# Patient Record
Sex: MALE | Race: WHITE | Employment: OTHER | URBAN - METROPOLITAN AREA
[De-identification: names, ages, dates, MRNs, and addresses within clinical notes are randomized per-mention and may not be internally consistent; named-entity substitution may affect disease eponyms.]

---

## 2018-08-13 PROBLEM — R53.83 OTHER FATIGUE: Status: ACTIVE | Noted: 2018-04-27

## 2018-08-13 PROBLEM — F90.9 ADHD: Status: ACTIVE | Noted: 2018-04-27

## 2018-11-16 ENCOUNTER — HOSPITAL ENCOUNTER (EMERGENCY)
Facility: HOSPITAL | Age: 33
Discharge: HOME | End: 2018-11-17
Attending: EMERGENCY MEDICINE
Payer: COMMERCIAL

## 2018-11-16 VITALS
WEIGHT: 160 LBS | RESPIRATION RATE: 18 BRPM | HEIGHT: 64 IN | TEMPERATURE: 98 F | BODY MASS INDEX: 27.31 KG/M2 | HEART RATE: 84 BPM | SYSTOLIC BLOOD PRESSURE: 142 MMHG | DIASTOLIC BLOOD PRESSURE: 77 MMHG | OXYGEN SATURATION: 98 %

## 2018-11-16 DIAGNOSIS — M79.89 FOOT SWELLING: Primary | ICD-10-CM

## 2018-11-16 PROCEDURE — 99283 EMERGENCY DEPT VISIT LOW MDM: CPT

## 2018-11-16 RX ORDER — IBUPROFEN 600 MG/1
600 TABLET ORAL ONCE
Status: COMPLETED | OUTPATIENT
Start: 2018-11-17 | End: 2018-11-17

## 2018-11-16 RX ORDER — KETOROLAC TROMETHAMINE 10 MG/1
10 TABLET, FILM COATED ORAL EVERY 6 HOURS PRN
Qty: 30 TABLET | Refills: 0 | Status: SHIPPED | OUTPATIENT
Start: 2018-11-16 | End: 2018-11-23

## 2018-11-16 RX ORDER — ONDANSETRON 4 MG/1
4 TABLET, ORALLY DISINTEGRATING ORAL ONCE
Status: COMPLETED | OUTPATIENT
Start: 2018-11-16 | End: 2018-11-16

## 2018-11-17 NOTE — ED PROVIDER NOTES
Patient Seen in: [facility] AND [facility] Emergency Department    History   Patient presents with:  Nausea    Stated Complaint: pain and numbness in right leg \"toes are turning purple\"    HPI    27-year-old male with no significant past medical history here w stated complaint: pain and numbness in right leg \"toes are turning purple\"  Other systems are as noted in HPI. Constitutional and vital signs reviewed. All other systems reviewed and negative except as noted above.     Physical Exam     ED Triage Vi oxygenation. PROCEDURES:  none    DIAGNOSTICS:   Labs:  No results found for this or any previous visit (from the past 24 hour(s)).     Imaging Results Available and Reviewed by me while in ED:          EMERGENCY DEPARTMENT COURSE AND TREATMENT:  Patient diagnosis)    Disposition:  Discharge  11/16/2018 11:58 pm    Follow-up:  Page Suárez MD  [address] [address]  [phone]    Schedule an appointment as soon as possible for a visit in 2 days  As needed        Medications Prescribed:

## 2024-08-21 PROCEDURE — 85379 FIBRIN DEGRADATION QUANT: CPT | Performed by: EMERGENCY MEDICINE

## 2024-08-21 PROCEDURE — 96374 THER/PROPH/DIAG INJ IV PUSH: CPT

## 2024-08-21 PROCEDURE — 99285 EMERGENCY DEPT VISIT HI MDM: CPT

## 2024-08-21 PROCEDURE — 80053 COMPREHEN METABOLIC PANEL: CPT

## 2024-08-21 PROCEDURE — 84484 ASSAY OF TROPONIN QUANT: CPT | Performed by: EMERGENCY MEDICINE

## 2024-08-21 PROCEDURE — 84484 ASSAY OF TROPONIN QUANT: CPT

## 2024-08-21 PROCEDURE — 96372 THER/PROPH/DIAG INJ SC/IM: CPT

## 2024-08-21 PROCEDURE — 85025 COMPLETE CBC W/AUTO DIFF WBC: CPT | Performed by: EMERGENCY MEDICINE

## 2024-08-21 PROCEDURE — 93005 ELECTROCARDIOGRAM TRACING: CPT

## 2024-08-21 PROCEDURE — 85025 COMPLETE CBC W/AUTO DIFF WBC: CPT

## 2024-08-21 PROCEDURE — 93010 ELECTROCARDIOGRAM REPORT: CPT

## 2024-08-21 RX ORDER — DEXTROAMPHETAMINE SACCHARATE, AMPHETAMINE ASPARTATE MONOHYDRATE, DEXTROAMPHETAMINE SULFATE AND AMPHETAMINE SULFATE 2.5; 2.5; 2.5; 2.5 MG/1; MG/1; MG/1; MG/1
10 CAPSULE, EXTENDED RELEASE ORAL EVERY MORNING
COMMUNITY

## 2024-08-22 ENCOUNTER — HOSPITAL ENCOUNTER (EMERGENCY)
Facility: HOSPITAL | Age: 39
Discharge: HOME | End: 2024-08-22
Attending: EMERGENCY MEDICINE
Payer: COMMERCIAL

## 2024-08-22 ENCOUNTER — APPOINTMENT (OUTPATIENT)
Dept: CT IMAGING | Facility: HOSPITAL | Age: 39
End: 2024-08-22
Attending: EMERGENCY MEDICINE
Payer: COMMERCIAL

## 2024-08-22 ENCOUNTER — APPOINTMENT (OUTPATIENT)
Dept: GENERAL RADIOLOGY | Facility: HOSPITAL | Age: 39
End: 2024-08-22
Payer: COMMERCIAL

## 2024-08-22 VITALS
WEIGHT: 160 LBS | RESPIRATION RATE: 14 BRPM | DIASTOLIC BLOOD PRESSURE: 87 MMHG | HEART RATE: 56 BPM | HEIGHT: 64 IN | OXYGEN SATURATION: 100 % | TEMPERATURE: 97 F | SYSTOLIC BLOOD PRESSURE: 118 MMHG | BODY MASS INDEX: 27.31 KG/M2

## 2024-08-22 DIAGNOSIS — R07.9 CHEST PAIN OF UNCERTAIN ETIOLOGY: Primary | ICD-10-CM

## 2024-08-22 LAB
ALBUMIN SERPL-MCNC: 4.9 G/DL (ref 3.2–4.8)
ALBUMIN/GLOB SERPL: 1.9 {RATIO} (ref 1–2)
ALP LIVER SERPL-CCNC: 62 U/L
ALT SERPL-CCNC: 34 U/L
ANION GAP SERPL CALC-SCNC: 7 MMOL/L (ref 0–18)
ATRIAL RATE: 56 BPM
BASOPHILS # BLD AUTO: 0.05 X10(3) UL (ref 0–0.2)
BASOPHILS NFR BLD AUTO: 0.7 %
BILIRUB SERPL-MCNC: 0.3 MG/DL (ref 0.3–1.2)
BUN BLD-MCNC: 18 MG/DL (ref 9–23)
CALCIUM BLD-MCNC: 9.9 MG/DL (ref 8.7–10.4)
CHLORIDE SERPL-SCNC: 102 MMOL/L (ref 98–112)
CHOLEST SERPL-MCNC: 310 MG/DL (ref ?–200)
CO2 SERPL-SCNC: 24 MMOL/L (ref 21–32)
CREAT BLD-MCNC: 0.95 MG/DL
EGFRCR SERPLBLD CKD-EPI 2021: 104 ML/MIN/1.73M2 (ref 60–?)
EOSINOPHIL # BLD AUTO: 0.24 X10(3) UL (ref 0–0.7)
EOSINOPHIL NFR BLD AUTO: 3.4 %
ERYTHROCYTE [DISTWIDTH] IN BLOOD BY AUTOMATED COUNT: 13.8 %
GLOBULIN PLAS-MCNC: 2.6 G/DL (ref 2–3.5)
GLUCOSE BLD-MCNC: 102 MG/DL (ref 70–99)
HCT VFR BLD AUTO: 41.6 %
HDLC SERPL-MCNC: 22 MG/DL (ref 40–59)
HGB BLD-MCNC: 15.9 G/DL
IMM GRANULOCYTES # BLD AUTO: 0.02 X10(3) UL (ref 0–1)
IMM GRANULOCYTES NFR BLD: 0.3 %
LDLC SERPL DIRECT ASSAY-MCNC: 65 MG/DL (ref ?–100)
LYMPHOCYTES # BLD AUTO: 3 X10(3) UL (ref 1–4)
LYMPHOCYTES NFR BLD AUTO: 42.5 %
MCH RBC QN AUTO: 32.2 PG (ref 26–34)
MCHC RBC AUTO-ENTMCNC: 38.2 G/DL (ref 31–37)
MCV RBC AUTO: 84.2 FL
MONOCYTES # BLD AUTO: 0.72 X10(3) UL (ref 0.1–1)
MONOCYTES NFR BLD AUTO: 10.2 %
MORPHOLOGY: NORMAL
NEUTROPHILS # BLD AUTO: 3.03 X10 (3) UL (ref 1.5–7.7)
NEUTROPHILS # BLD AUTO: 3.03 X10(3) UL (ref 1.5–7.7)
NEUTROPHILS NFR BLD AUTO: 42.9 %
NONHDLC SERPL-MCNC: 288 MG/DL (ref ?–130)
OSMOLALITY SERPL CALC.SUM OF ELEC: 278 MOSM/KG (ref 275–295)
P AXIS: 54 DEGREES
P-R INTERVAL: 162 MS
PLATELET # BLD AUTO: 224 10(3)UL (ref 150–450)
PLATELET MORPHOLOGY: NORMAL
PROT SERPL-MCNC: 7.5 G/DL (ref 5.7–8.2)
Q-T INTERVAL: 366 MS
QRS DURATION: 98 MS
QTC CALCULATION (BEZET): 353 MS
R AXIS: 46 DEGREES
RBC # BLD AUTO: 4.94 X10(6)UL
SODIUM SERPL-SCNC: 133 MMOL/L (ref 136–145)
T AXIS: 42 DEGREES
TRIGL SERPL-MCNC: 2515 MG/DL (ref 30–149)
TROPONIN I SERPL HS-MCNC: <3 NG/L
TROPONIN I SERPL HS-MCNC: <3 NG/L
VENTRICULAR RATE: 56 BPM
WBC # BLD AUTO: 7.1 X10(3) UL (ref 4–11)

## 2024-08-22 PROCEDURE — 71045 X-RAY EXAM CHEST 1 VIEW: CPT | Performed by: EMERGENCY MEDICINE

## 2024-08-22 PROCEDURE — 71275 CT ANGIOGRAPHY CHEST: CPT | Performed by: EMERGENCY MEDICINE

## 2024-08-22 PROCEDURE — 83721 ASSAY OF BLOOD LIPOPROTEIN: CPT | Performed by: EMERGENCY MEDICINE

## 2024-08-22 PROCEDURE — 84484 ASSAY OF TROPONIN QUANT: CPT | Performed by: EMERGENCY MEDICINE

## 2024-08-22 PROCEDURE — 80053 COMPREHEN METABOLIC PANEL: CPT | Performed by: EMERGENCY MEDICINE

## 2024-08-22 PROCEDURE — 80061 LIPID PANEL: CPT | Performed by: EMERGENCY MEDICINE

## 2024-08-22 RX ORDER — DICYCLOMINE HCL 20 MG
20 TABLET ORAL 3 TIMES DAILY
Qty: 20 TABLET | Refills: 0 | Status: SHIPPED | OUTPATIENT
Start: 2024-08-22

## 2024-08-22 RX ORDER — DICYCLOMINE HYDROCHLORIDE 10 MG/ML
20 INJECTION INTRAMUSCULAR ONCE
Status: COMPLETED | OUTPATIENT
Start: 2024-08-22 | End: 2024-08-22

## 2024-08-22 RX ORDER — MAGNESIUM HYDROXIDE/ALUMINUM HYDROXICE/SIMETHICONE 120; 1200; 1200 MG/30ML; MG/30ML; MG/30ML
30 SUSPENSION ORAL ONCE
Status: COMPLETED | OUTPATIENT
Start: 2024-08-22 | End: 2024-08-22

## 2024-08-22 RX ORDER — OMEPRAZOLE 40 MG/1
40 CAPSULE, DELAYED RELEASE ORAL DAILY
Qty: 30 CAPSULE | Refills: 0 | Status: SHIPPED | OUTPATIENT
Start: 2024-08-22 | End: 2024-09-21

## 2024-08-22 NOTE — ED PROVIDER NOTES
Patient Seen in: [facility] Emergency Department      History     Chief Complaint   Patient presents with    Chest Pain Angina     Stated Complaint: chest pain, started 2 hours pta    Subjective:   HPI    Patient is a 39-year-old male otherwise healthy presents to ED for evaluation of chest pain.  Patient states symptoms started while laying down tonight a couple hours ago.  He complains of a burning squeezing, mid chest that goes slightly to his left arm.  Last for about 5 seconds.  Happens about every 30 seconds.  He states he was short of breath at home and had some sweating.  No vomiting.  Patient denies any history of hypertension, diabetes, high cholesterol, smoking, or family history of heart disease.  Patient denies thromboembolic risk factors such as family history, smoking, recent travel, recent surgery.  He has never had a cardiac stress test.  Patient states he had similar symptoms a couple times about a month ago also with laying down    Objective:   History reviewed. No pertinent past medical history.           History reviewed. No pertinent surgical history.             Social History     Socioeconomic History    Marital status:    Tobacco Use    Smoking status: Never    Smokeless tobacco: Never   Vaping Use    Vaping status: Never Used   Substance and Sexual Activity    Alcohol use: No    Drug use: No     Social Determinants of Health     Food Insecurity: Low Risk  (9/13/2022)    Received from [facility]    Food Insecurity     Have there been times that your food ran out, and you didn't have money to get more?: No     Are there times that you worry that this might happen?: No   Transportation Needs: Low Risk  (9/13/2022)    Received from [facility]    Transportation Needs     Do you have trouble getting transportation to medical appointments?: No   Housing Stability: Low Risk  (9/13/2022)    Received from [facility]     Housing Stability     Are you concerned about having a safe and reliable place to live?: No              Review of Systems    Positive for stated Chief Complaint: Chest Pain Angina    Other systems are as noted in HPI.  Constitutional and vital signs reviewed.      All other systems reviewed and negative except as noted above.    Physical Exam     ED Triage Vitals [08/21/24 2341]   /90   Pulse 67   Resp 22   Temp 96.8 °F (36 °C)   Temp src Temporal   SpO2 99 %   O2 Device None (Room air)       Current Vitals:   Vital Signs  BP: 118/87  Pulse: 56  Resp: 14  Temp: 96.8 °F (36 °C)  Temp src: Temporal  MAP (mmHg): 96    Oxygen Therapy  SpO2: 100 %  O2 Device: None (Room air)            Physical Exam    GENERAL: No acute distress, well appearing and non-toxic, Alert and oriented X 3   HEENT: Normocephalic, atraumatic.  Moist mucous membranes.  Pupils equal round reactive to light and accommodation, extraocular motion is intact, sclerae white, conjunctiva is pink.  Oropharynx is unremarkable, no exudate.  NECK: Supple, trachea midline, no lymphadenopathy.   LUNG: Lungs clear to auscultation bilaterally, no wheezing, no rales, no rhonchi.  CARDIOVASCULAR: Regular rate and rhythm.  Normal S1S2.  No S3S4 or murmur.  ABDOMEN: Bowel sounds are present. Soft. nondistended, no pulsatile masses. nontender  MUSCULOSKELETAL: No calf tenderness.  Dorsalis and Posterior Tibial pulses present. No clubbing. No cyanosis.  No edema.   SKIN EXAMINATIoN: Warm and dry with normal appearance.  No rashes or lesions.  NEUROLOGICAL:  Motor strength intact all groups.  normal sensation, speech intact    ED Course     Labs Reviewed   COMP METABOLIC PANEL (14) - Abnormal; Notable for the following components:       Result Value    Glucose 102 (*)     Sodium 133 (*)     Albumin 4.9 (*)     All other components within normal limits   CBC WITH DIFFERENTIAL WITH PLATELET - Abnormal; Notable for the following components:    MCHC 38.2 (*)     All  other components within normal limits   LIPID PANEL - Abnormal; Notable for the following components:    Cholesterol, Total 310 (*)     HDL Cholesterol 22 (*)     Triglycerides 2,515 (*)     Non HDL Chol 288 (*)     All other components within normal limits   TROPONIN I HIGH SENSITIVITY - Normal   RBC MORPHOLOGY SCAN - Normal   TROPONIN I HIGH SENSITIVITY - Normal   DIRECT LDL - Normal   RAINBOW DRAW LAVENDER   RAINBOW DRAW LIGHT GREEN   RAINBOW DRAW BLUE   Sodium 133  EKG    Rate, intervals and axes as noted on EKG Report.  Rate: 56  Rhythm: Sinus Rhythm  Reading: Sinus bradycardia without acute changes           I personally reviewed xray films of chest and independent interpretation shows no evidence for pneumonia or pneumothorax.  I also reviewed formal xray report as read by radiology with findings below:  Xray of chest read by [facility] radiology shows no acute process     I personally reviewd CT images of chest and independent interpretation shows no evidence for pulmonary embolism.  I also viewed formal radiology report as read by radiology with findings below:  CT of chest read by [facility] radiology shows no evidence for pulmonary embolism or dissection      Medications   alum-mag hydroxide-simethicone (Maalox) 200-200-20 MG/5ML oral suspension 30 mL (30 mL Oral Given 8/22/24 0101)   pantoprazole (Protonix) 40 mg in sodium chloride 0.9% PF 10 mL IV push (40 mg Intravenous Given 8/22/24 0100)   dicyclomine (Bentyl) 10 MG/ML injection 20 mg (20 mg Intramuscular Given 8/22/24 0101)   iopamidol 76% (ISOVUE-370) injection for power injector (100 mL Intravenous Given 8/22/24 0146)         Heart Score:    HEART Score      Title      Criteria Score   Age: <45 Age Score: 0   History: Slightly Suspicious Hx Score: 0     EKG: Normal EKG Score: 0   HTN: No   Hypercholesterolemia: No   Atherosclerosis/PVD: No     DM: No   BMI>30kg/m2: No   Smoking: No   Family History: No         Other Risk Factor Score: 0              Lab Results   Component Value Date    TROPHS <3 08/22/2024           HEART Score: 0        Risk of adverse cardiac event is 0.9-1.7%            MDM      Patient is a 39-year-old male presents to ED for evaluation of chest pain.  Differential esophageal spasm, esophagitis, ACS, PE.  Patient had laboratory test performed showing normal troponin.  D-dimer unable to be performed due to significant lipid burden in the blood per lab.  EKG was normal.  I did perform a CT of his chest given inability obtain D-dimer which was negative for any acute process such as PE or dissection.  Patient's symptoms are atypical nature lasting about 5 seconds at a time.  Suspect esophageal spasm.  Patient given Maalox, Protonix, Bentyl and upon repeat exam he stated that seemed to help his symptoms out.  Spoke with  and patient was set up for outpatient cardiac stress test.  Patient vies to follow-up with cardiologist after this.  He will be sent home with Pri[facility] lundyyl.  May take Maalox at home.  Heart score of 0 making patient low risk for major adverse cardiac event in the next 30 days.          Patient was screened and evaluated during this visit.   As a treating physician attending to the patient, I determined, within reasonable clinical confidence and prior to discharge, that an emergency medical condition was not or was no longer present.  There was no indication for further evaluation, treatment or admission on an emergency basis.  Comprehensive verbal and written discharge and follow-up instructions were provided to help prevent relapse or worsening.  Patient was instructed to follow-up with her primary care provider for further evaluation and treatment, but to return immediately to the ER for worsening, concerning, new, changing or persisting symptoms.  I discussed the case with the patient and they had no questions, complaints, or concerns.  Patient felt comfortable going home.                              Medical Decision Making      Disposition and Plan     Clinical Impression:  1. Chest pain of uncertain etiology         Disposition:  Discharge  8/22/2024  3:43 am    Follow-up:  Meredith Szymanski MD  [address]  [place] [address]  [phone]    Follow up  As needed    Stanislaw Ortiz MD  [address] [address] [phone]    Follow up in 1 week(s)            Medications Prescribed:  Discharge Medication List as of 8/22/2024  3:51 AM        START taking these medications    Details   Omeprazole 40 MG Oral Capsule Delayed Release Take 1 capsule (40 mg total) by mouth daily., Normal, Disp-30 capsule, R-0      dicyclomine 20 MG Oral Tab Take 1 tablet (20 mg total) by mouth 3 (three) times daily., Normal, Disp-20 tablet, R-0

## 2024-08-22 NOTE — ED INITIAL ASSESSMENT (HPI)
Pt to ER with c/o mid chest pain that started about 2 hr PTA.  Pt states \"hard to take a deep breath\", pt with c/o left upper arm pain.

## 2024-08-22 NOTE — CM/SW NOTE
Received a call from Dr. Miranda requesting to schedule outpt stress echo for the pt. There's some opening today but pt unable to do it today, pt preferred another day. Next available schedule is on 8/28 at 10am- pt agreed. [facility] scheduled pt on 8/28 at 10am for outpt stress echo. Dr. Miranda and pt's Rn made aware.

## 2024-08-22 NOTE — DISCHARGE INSTRUCTIONS
Follow-up with cardiologist after your stress test    Your stress test is scheduled for August 28    Begin Prilosec     May take Maalox over-the-counter    Dicyclomine for spasm

## 2024-09-07 ENCOUNTER — HOSPITAL ENCOUNTER (OUTPATIENT)
Dept: CV DIAGNOSTICS | Facility: HOSPITAL | Age: 39
Discharge: HOME | End: 2024-09-07
Attending: EMERGENCY MEDICINE
Payer: COMMERCIAL

## 2024-09-07 DIAGNOSIS — R07.9 CHEST PAIN OF UNCERTAIN ETIOLOGY: ICD-10-CM

## 2024-09-07 PROCEDURE — 93350 STRESS TTE ONLY: CPT | Performed by: EMERGENCY MEDICINE

## 2024-09-07 PROCEDURE — 93018 CV STRESS TEST I&R ONLY: CPT | Performed by: EMERGENCY MEDICINE

## 2024-09-07 PROCEDURE — 93017 CV STRESS TEST TRACING ONLY: CPT | Performed by: EMERGENCY MEDICINE

## (undated) NOTE — ED AVS SNAPSHOT
Doron Cerna   MRN: [id number]    Department:  [facility] Emergency Department   Date of Visit:  11/16/2018           Disclosure     Insurance plans vary and the physician(s) referred by the ER may not be covered by your plan.  Please contact your CARE PHYSICIAN AT ONCE OR RETURN IMMEDIATELY TO THE EMERGENCY DEPARTMENT. If you have been prescribed any medication(s), please fill your prescription right away and begin taking the medication(s) as directed.   If you believe that any of the medications